# Patient Record
Sex: MALE | Employment: FULL TIME | URBAN - METROPOLITAN AREA
[De-identification: names, ages, dates, MRNs, and addresses within clinical notes are randomized per-mention and may not be internally consistent; named-entity substitution may affect disease eponyms.]

---

## 2019-11-10 ENCOUNTER — OFFICE VISIT (OUTPATIENT)
Dept: URGENT CARE | Facility: CLINIC | Age: 24
End: 2019-11-10
Payer: COMMERCIAL

## 2019-11-10 VITALS
BODY MASS INDEX: 35.78 KG/M2 | DIASTOLIC BLOOD PRESSURE: 92 MMHG | TEMPERATURE: 97.6 F | OXYGEN SATURATION: 100 % | RESPIRATION RATE: 16 BRPM | SYSTOLIC BLOOD PRESSURE: 138 MMHG | WEIGHT: 303 LBS | HEART RATE: 88 BPM | HEIGHT: 77 IN

## 2019-11-10 DIAGNOSIS — J98.8 RTI (RESPIRATORY TRACT INFECTION): ICD-10-CM

## 2019-11-10 PROCEDURE — 99214 OFFICE O/P EST MOD 30 MIN: CPT | Performed by: FAMILY MEDICINE

## 2019-11-10 RX ORDER — ALBUTEROL SULFATE 90 UG/1
2 AEROSOL, METERED RESPIRATORY (INHALATION) EVERY 6 HOURS PRN
Qty: 8.5 G | Refills: 3 | Status: SHIPPED | OUTPATIENT
Start: 2019-11-10

## 2019-11-10 RX ORDER — AZITHROMYCIN 250 MG/1
TABLET, FILM COATED ORAL
Qty: 6 TAB | Refills: 0 | Status: SHIPPED | OUTPATIENT
Start: 2019-11-10

## 2019-11-10 RX ORDER — PREDNISONE 20 MG/1
40 TABLET ORAL EVERY MORNING
Qty: 10 TAB | Refills: 0 | Status: SHIPPED | OUTPATIENT
Start: 2019-11-10 | End: 2019-11-15

## 2019-11-10 RX ORDER — PROMETHAZINE HYDROCHLORIDE AND CODEINE PHOSPHATE 6.25; 1 MG/5ML; MG/5ML
5 SYRUP ORAL 4 TIMES DAILY PRN
Qty: 140 ML | Refills: 0 | Status: SHIPPED | OUTPATIENT
Start: 2019-11-10 | End: 2019-11-17

## 2019-11-10 ASSESSMENT — ENCOUNTER SYMPTOMS: COUGH: 1

## 2019-11-10 NOTE — PROGRESS NOTES
"Subjective:      MICAH Shaikh is a 24 y.o. male who presents with Cough (x2 wks)    - This is a pleasant and non toxic appearing 24 y.o. male with c/o 2 wks w/ cough. No NVFC/CP/SOB. No sputum. Nothing makes it better or worse             ALLERGIES:  Patient has no known allergies.     PMH:  History reviewed. No pertinent past medical history.     PSH:  History reviewed. No pertinent surgical history.    MEDS:    Current Outpatient Medications:   •  promethazine-codeine (PHENERGAN-CODEINE) 6.25-10 MG/5ML Syrup, Take 5 mL by mouth 4 times a day as needed for Cough for up to 7 days., Disp: 140 mL, Rfl: 0  •  azithromycin (ZITHROMAX) 250 MG Tab, Use as directed, Disp: 6 Tab, Rfl: 0  •  predniSONE (DELTASONE) 20 MG Tab, Take 2 Tabs by mouth every morning for 5 days., Disp: 10 Tab, Rfl: 0  •  albuterol (PROAIR HFA) 108 (90 Base) MCG/ACT Aero Soln inhalation aerosol, Inhale 2 Puffs by mouth every 6 hours as needed for Shortness of Breath., Disp: 8.5 g, Rfl: 3    ** I have documented what I find to be significant in regards to past medical, social, family and surgical history  in my HPI or under PMH/PSH/FH review section, otherwise it is contributory **             HPI    Review of Systems   Respiratory: Positive for cough.    All other systems reviewed and are negative.         Objective:     /92   Pulse 88   Temp 36.4 °C (97.6 °F)   Resp 16   Ht 1.956 m (6' 5\")   Wt (!) 137.4 kg (303 lb)   SpO2 100%   BMI 35.93 kg/m²      Physical Exam  Vitals signs and nursing note reviewed.   Constitutional:       General: He is not in acute distress.     Appearance: He is well-developed. He is not diaphoretic.   HENT:      Head: Normocephalic and atraumatic.      Mouth/Throat:      Mouth: Mucous membranes are moist.      Pharynx: Oropharynx is clear. No posterior oropharyngeal erythema.   Eyes:      Conjunctiva/sclera: Conjunctivae normal.   Cardiovascular:      Heart sounds: Normal heart sounds. No murmur.   Pulmonary:    "   Effort: Pulmonary effort is normal. No respiratory distress.      Breath sounds: Normal breath sounds.   Skin:     General: Skin is warm and dry.   Neurological:      Mental Status: He is alert.      Motor: No abnormal muscle tone.   Psychiatric:         Judgment: Judgment normal.                 Assessment/Plan:           1. RTI (respiratory tract infection)  promethazine-codeine (PHENERGAN-CODEINE) 6.25-10 MG/5ML Syrup    azithromycin (ZITHROMAX) 250 MG Tab    predniSONE (DELTASONE) 20 MG Tab    albuterol (PROAIR HFA) 108 (90 Base) MCG/ACT Aero Soln inhalation aerosol       - rest/hydrate       Dx & d/c instructions discussed w/ patient and/or family members.     Follow up with PCP (or here if PCP unavailable) in 2-3 days if symptoms not improving, ER if feeling/getting worse.    Any realistic and/or common medication side effects that may have been given today(i.e. Rash, GI upset/constipation, sedation, elevation of BP or blood sugars) reviewed.     Patient left in stable condition      reviewed if narcotics given

## 2024-01-22 ENCOUNTER — APPOINTMENT (OUTPATIENT)
Dept: URGENT CARE | Facility: PHYSICIAN GROUP | Age: 29
End: 2024-01-22
Payer: COMMERCIAL

## 2024-04-08 ENCOUNTER — HOSPITAL ENCOUNTER (EMERGENCY)
Facility: MEDICAL CENTER | Age: 29
End: 2024-04-08
Attending: EMERGENCY MEDICINE
Payer: COMMERCIAL

## 2024-04-08 VITALS
WEIGHT: 315 LBS | SYSTOLIC BLOOD PRESSURE: 138 MMHG | HEART RATE: 97 BPM | BODY MASS INDEX: 37.19 KG/M2 | DIASTOLIC BLOOD PRESSURE: 82 MMHG | RESPIRATION RATE: 18 BRPM | TEMPERATURE: 97.7 F | OXYGEN SATURATION: 94 % | HEIGHT: 77 IN

## 2024-04-08 DIAGNOSIS — S51.811A LACERATION OF RIGHT FOREARM, INITIAL ENCOUNTER: ICD-10-CM

## 2024-04-08 PROCEDURE — 99282 EMERGENCY DEPT VISIT SF MDM: CPT

## 2024-04-08 PROCEDURE — 303747 HCHG EXTRA SUTURE

## 2024-04-08 PROCEDURE — 304217 HCHG IRRIGATION SYSTEM

## 2024-04-08 PROCEDURE — 304999 HCHG REPAIR-SIMPLE/INTERMED LEVEL 1

## 2024-04-08 RX ORDER — LIDOCAINE HYDROCHLORIDE AND EPINEPHRINE 10; 10 MG/ML; UG/ML
10 INJECTION, SOLUTION INFILTRATION; PERINEURAL ONCE
Status: DISCONTINUED | OUTPATIENT
Start: 2024-04-08 | End: 2024-04-08 | Stop reason: HOSPADM

## 2024-04-08 NOTE — ED PROVIDER NOTES
"ED Provider Note    Scribed for Jackeline Luz M.D. by Debra Correia. 4/8/2024, 7:41 AM.    Primary care provider: Pcp Pt States None  Means of arrival: Walk in  History obtained from: Patient  History limited by: None     CHIEF COMPLAINT  Chief Complaint   Patient presents with    Laceration     Patient scraped his arm on a window seal, has laceration to right forearm, bleeding controlled at this time. Patient states he is up to date on tetanus.       HPI/ROEL  MICAH Shaikh is a 29 y.o. male who presents to the Emergency Department for a laceration to his right forearm onset this morning. The patient reports that he cut  his forearm on a piece of trim. He endorses being up to date on his tetanus as his last shot was \"real recently.\" No alleviating or exacerbating factors reported.     EXTERNAL RECORDS REVIEWED  Hospital Records Reviewed and show that the patient was last seen 1/22/24 at [facility] for a rapid heart rate. He was diagnosed with atrial fibrillation with RVR.    LIMITATION TO HISTORY   Select: : None    OUTSIDE HISTORIAN(S):  None      PAST MEDICAL HISTORY   None noted    SURGICAL HISTORY  patient denies any surgical history    SOCIAL HISTORY  Social History     Tobacco Use    Smoking status: Unknown    Smokeless tobacco: Never   Vaping Use    Vaping Use: Every day    Substances: Nicotine   Substance Use Topics    Alcohol use: Yes     Alcohol/week: 12.6 oz     Types: 21 Cans of beer per week    Drug use: Never      Social History     Substance and Sexual Activity   Drug Use Never       FAMILY HISTORY  History reviewed. No pertinent family history.    CURRENT MEDICATIONS  Home Medications       Reviewed by Thai Williamson R.N. (Registered Nurse) on 04/08/24 at 0638  Med List Status: Not Addressed     Medication Last Dose Status   albuterol (PROAIR HFA) 108 (90 Base) MCG/ACT Aero Soln inhalation aerosol  Active   azithromycin (ZITHROMAX) 250 MG Tab  Active                    ALLERGIES  No Known " "Allergies    PHYSICAL EXAM  VITAL SIGNS: BP (!) 164/96   Pulse (!) 104   Temp 36.1 °C (97 °F) (Temporal)   Resp 18   Ht 1.956 m (6' 5\")   Wt (!) 161 kg (355 lb 2.6 oz)   SpO2 98%   BMI 42.12 kg/m²   Vitals reviewed by myself.  Nursing note and vitals reviewed.  Constitutional: Well-developed and well-nourished. No acute distress.   HENT: Head is normocephalic and atraumatic.  Eyes: extra-ocular movements intact  Cardiovascular: Regular rate and regular rhythm. No murmur heard.  Pulmonary/Chest: Breath sounds normal. No wheezes or rales.   Musculoskeletal: Extremities exhibit normal range of motion without edema or tenderness. Right proximal superficial forearm with 2cm laceration, no underlying exposed tendon or muscle, no fascial violation  Neurological: Awake and alert  Skin: Skin is warm and dry. No rash.       DIAGNOSTIC STUDIES:    PROCEDURES  Laceration Repair Procedure Note    Indication: Laceration    Procedure: The patient was placed in the appropriate position and anesthesia around the laceration was obtained by infiltration using 1% Lidocaine with epinephrine. The wound was minimally contaminated .The area was then cleansed with betadine and draped in a sterile fashion. The laceration was closed with 4-0 Ethilon using interrupted sutures. There were no additional lacerations requiring repair. The wound area was then dressed with bacitracin.      Total repaired wound length: 2 cm.     Other Items: Suture count: 5    The patient tolerated the procedure well.    Complications: None       COURSE & MEDICAL DECISION MAKING    ED OBS: No; Patient does not meet criteria for ED Observation.      INITIAL ASSESSMENT, ED COURSE AND PLAN    Patient is a 29-year-old male who comes in for evaluation of laceration.  Differential diagnosis includes laceration, deep structure injury, neurovascular injury.  On exam laceration is superficial, patient is well-appearing with vitals in normal limits.  No evidence of deep " structure injury.  Therefore at this time laceration is repaired.  Tetanus is up-to-date.  Patient is given wound care instructions and advised to have sutures removed in 7 days.  He is then given strict return precautions and discharged in stable condition.       REASSESSMENTS   7:43 AM - Patient seen and examined at bedside. Discussed plan of care, including closing up the patient's laceration. Patient agrees to the plan of care.        8:23 AM  - Laceration repair was completed by me at this time.      DISPOSITION AND DISCUSSIONS  I have discussed management of the patient with the following physicians and MILIND's:  None    Discussion of management with other [facility] or appropriate source(s): None     Escalation of care considered, and ultimately not performed: see above    Barriers to care at this time, including but not limited to: Patient does not have established PCP.     Decision tools and prescription drugs considered including, but not limited to: see above.    The patient will return for new or worsening symptoms and is stable at the time of discharge.    The patient is referred to a primary physician for blood pressure management, diabetic screening, and for all other preventative health concerns.    DISPOSITION:  Patient will be discharged home in stable condition.    FOLLOW UP:  [facility]  [address] [address]  [phone]            FINAL IMPRESSION  1. Laceration of right forearm, initial encounter     2.     Laceration repair procedure     Debra AGEE (Scribnereyda), am scribing for, and in the presence of, Jackeline Luz M.D..    Electronically signed by: Debra Correia ([place]), 4/8/2024    Jackeline AGEE M.D. personally performed the services described in this documentation, as scribed by Debra Correia in my presence, and it is both accurate and complete.    The note accurately reflects work and decisions made by me.  Jackeline Luz M.D.   4/8/2024  12:20 PM

## 2024-04-08 NOTE — ED TRIAGE NOTES
"Chief Complaint   Patient presents with    Laceration     Patient scraped his arm on a window seal, has laceration to right forearm, bleeding controlled at this time. Patient states he is up to date on tetanus.       Pt is alert and oriented, speaking in full sentences, follows commands and responds appropriately to questions. Resperations are even and unlabored.      Pt placed in lobby. Pt educated on triage process. Pt encouraged to alert staff for any changes.     Patient and staff wearing appropriate PPE.    BP (!) 164/96   Pulse (!) 104   Temp 36.1 °C (97 °F) (Temporal)   Resp 18   Ht 1.956 m (6' 5\")   Wt (!) 161 kg (355 lb 2.6 oz)   SpO2 98%    "

## 2024-04-08 NOTE — ED NOTES
Pt's wound irrigated with 1L NS. Pt tolerated well, wound covered temporarily with sterile gauze.

## 2024-04-08 NOTE — ED NOTES
Pt ambulated to room ORT 1 without incident. Agree with triage note. Chart up for ERP. Pt denies further needs at this time.

## 2024-06-19 ENCOUNTER — APPOINTMENT (OUTPATIENT)
Dept: URGENT CARE | Facility: PHYSICIAN GROUP | Age: 29
End: 2024-06-19
Payer: COMMERCIAL

## 2024-07-13 ENCOUNTER — OFFICE VISIT (OUTPATIENT)
Dept: URGENT CARE | Facility: PHYSICIAN GROUP | Age: 29
End: 2024-07-13
Payer: COMMERCIAL

## 2024-07-13 ENCOUNTER — APPOINTMENT (OUTPATIENT)
Dept: RADIOLOGY | Facility: IMAGING CENTER | Age: 29
End: 2024-07-13
Attending: NURSE PRACTITIONER
Payer: COMMERCIAL

## 2024-07-13 VITALS
HEIGHT: 77 IN | TEMPERATURE: 98.4 F | WEIGHT: 315 LBS | OXYGEN SATURATION: 99 % | HEART RATE: 90 BPM | BODY MASS INDEX: 37.19 KG/M2 | RESPIRATION RATE: 18 BRPM | DIASTOLIC BLOOD PRESSURE: 80 MMHG | SYSTOLIC BLOOD PRESSURE: 136 MMHG

## 2024-07-13 DIAGNOSIS — J98.01 BRONCHOSPASM: ICD-10-CM

## 2024-07-13 DIAGNOSIS — R05.9 COUGH, UNSPECIFIED TYPE: ICD-10-CM

## 2024-07-13 PROBLEM — I48.19 PERSISTENT ATRIAL FIBRILLATION (HCC): Status: ACTIVE | Noted: 2024-01-22

## 2024-07-13 PROCEDURE — 3079F DIAST BP 80-89 MM HG: CPT | Performed by: NURSE PRACTITIONER

## 2024-07-13 PROCEDURE — 3075F SYST BP GE 130 - 139MM HG: CPT | Performed by: NURSE PRACTITIONER

## 2024-07-13 PROCEDURE — 99204 OFFICE O/P NEW MOD 45 MIN: CPT | Performed by: NURSE PRACTITIONER

## 2024-07-13 PROCEDURE — 71046 X-RAY EXAM CHEST 2 VIEWS: CPT | Mod: TC | Performed by: RADIOLOGY

## 2024-07-13 RX ORDER — BENZONATATE 100 MG/1
100 CAPSULE ORAL 3 TIMES DAILY PRN
Qty: 60 CAPSULE | Refills: 0 | Status: SHIPPED | OUTPATIENT
Start: 2024-07-13

## 2024-07-13 RX ORDER — METHYLPREDNISOLONE 4 MG/1
4 TABLET ORAL DAILY
Qty: 21 EACH | Refills: 0 | Status: SHIPPED | OUTPATIENT
Start: 2024-07-13

## 2024-07-13 RX ORDER — AMLODIPINE AND VALSARTAN 5; 320 MG/1; MG/1
1 TABLET ORAL DAILY
COMMUNITY
Start: 2024-05-11

## 2024-07-13 RX ORDER — ALBUTEROL SULFATE 90 UG/1
2 AEROSOL, METERED RESPIRATORY (INHALATION) EVERY 6 HOURS PRN
Qty: 8.5 G | Refills: 0 | Status: SHIPPED | OUTPATIENT
Start: 2024-07-13

## 2024-10-22 ENCOUNTER — HOSPITAL ENCOUNTER (OUTPATIENT)
Dept: LAB | Facility: MEDICAL CENTER | Age: 29
End: 2024-10-22
Attending: OBSTETRICS & GYNECOLOGY
Payer: COMMERCIAL

## 2024-10-22 PROCEDURE — 86780 TREPONEMA PALLIDUM: CPT

## 2024-10-22 PROCEDURE — 36415 COLL VENOUS BLD VENIPUNCTURE: CPT

## 2024-10-22 PROCEDURE — 87340 HEPATITIS B SURFACE AG IA: CPT

## 2024-10-22 PROCEDURE — 86706 HEP B SURFACE ANTIBODY: CPT

## 2024-10-22 PROCEDURE — 87389 HIV-1 AG W/HIV-1&-2 AB AG IA: CPT

## 2024-10-22 PROCEDURE — 87491 CHLMYD TRACH DNA AMP PROBE: CPT

## 2024-10-22 PROCEDURE — 87591 N.GONORRHOEAE DNA AMP PROB: CPT

## 2024-10-22 PROCEDURE — 86803 HEPATITIS C AB TEST: CPT

## 2024-10-23 LAB
C TRACH DNA SPEC QL NAA+PROBE: NEGATIVE
HBV SURFACE AB SERPL IA-ACNC: 70.1 MIU/ML (ref 0–10)
HBV SURFACE AG SER QL: NORMAL
HCV AB SER QL: NORMAL
HIV 1+2 AB+HIV1 P24 AG SERPL QL IA: NORMAL
N GONORRHOEA DNA SPEC QL NAA+PROBE: NEGATIVE
SPECIMEN SOURCE: NORMAL
T PALLIDUM AB SER QL IA: NORMAL